# Patient Record
(demographics unavailable — no encounter records)

---

## 2024-12-13 NOTE — ASSESSMENT
[FreeTextEntry1] : 71-year-old male presented to the ER 10/13/2024 for left flank pain and was found to have left hydroureteronephrosis and a 3 mm calcification at the left UVJ.  He had follow-up ultrasound imaging about 1 week after his ER visit, and these images showed a left renal cyst versus dilated extrarenal pelvis, and additional left renal cyst, and BPH.  Informed patient the additional left lower pole renal cyst appears to be a simple cyst, which is benign and does not need further imaging.  Recommended renal ultrasound to reassess the hydronephrosis VS left renal cyst VS dilated extrarenal pelvis.  An alternative was to obtain a pre and postcontrast CT scan of the abd/pelvis with delayed postcontrast as suggested by the reading Radiologist. However, patient would like to avoid any radiation exposure at this time. Because of this, he elected to forgo the KUB that was discussed to assess the 3 mm UVJ calculus as well.  He is currently asymptomatic. Informed him the 3 mm calculus likely passed.   Recommended a daily water intake goal of 2-2.5L and can add lemon/lime to prevent future renal calculi.   He would like to follow up in 6 months without doing any imaging at this time.

## 2024-12-13 NOTE — PHYSICAL EXAM
[Normal Appearance] : normal appearance [Well Groomed] : well groomed [General Appearance - In No Acute Distress] : no acute distress [] : no respiratory distress [Respiration, Rhythm And Depth] : normal respiratory rhythm and effort [Exaggerated Use Of Accessory Muscles For Inspiration] : no accessory muscle use [Normal Station and Gait] : the gait and station were normal for the patient's age [Oriented To Time, Place, And Person] : oriented to person, place, and time [Affect] : the affect was normal [Mood] : the mood was normal [Costovertebral Angle Tenderness] : no ~M costovertebral angle tenderness

## 2024-12-13 NOTE — HISTORY OF PRESENT ILLNESS
[FreeTextEntry1] : 71-year-old male is a new patient here for a kidney stone.  He presented to the ED on 10/13/2024 for left flank pain.  Documents reviewed.  He was found to have left hydroureteronephrosis and a 3 mm calcification at the left UVJ.  Labs reviewed and interpreted by me and discussed with the patient. Labs: -Creatinine 1.0 -UA: Negative nitrite, negative leuk, 1 RBC/hpf, 2 WBC/hpf  CT images reviewed and interpreted by me and discussed with the patient. CT: -KIDNEYS: Moderate left hydroureteronephrosis and mildly delayed left nephrogram. Left lower pole and parapelvic cysts. There is a 3 mm calcification at the left UV junction.   Patient had an abdominal and urinary bladder ultrasound 10/21/2024.  He brought a CD with a copy of the images, which has been uploaded to his chart.  He also provided reports of these images. The images were reviewed and interpreted by me and discussed with the patient. Abdominal ultrasound 10/21/2024: -3.7 cm interpolar or pelvic left renal cyst versus dilated extrarenal pelvis. -Additional left renal cyst measures 2.0 cm.  Bladder ultrasound 10/21/2024: -Prevoid volume 129 cc.  PVR 16 cc -Prostate volume 45.3 g  Per patient, pain resolved same day in ER and has not had recurrence. States he was not sent home with any prescription medications.  This is his first renal stone in his lifetime.  Denies fever, chills, nausea, vomiting, flank pain, gross hematuria, dysuria.   Denies ever smoking.  Retired -- previously a Writer

## 2025-06-13 NOTE — ASSESSMENT
[FreeTextEntry1] :   Ordered Ur micro and UCx as patient to would like to check for proteinuria  Follow up as needed provided the above is unremarkable.